# Patient Record
(demographics unavailable — no encounter records)

---

## 2024-11-08 NOTE — HISTORY OF PRESENT ILLNESS
[de-identified] : 51F with medical history of uterine fibroids presents as a post- hospital visit to hematology clinic for anemia workup. Pt was admitted to [facility] 09/14-16 for symptomatic anemia (pre-syncopal) and was found to have Hgb 3.9, MCV 54.9, and EKG c/w demand ischemia s/p 3u pRBCs. CTA with no active bleed; TVUS c/w multiple enlarged uterine fibroids. Iron studies %sat 8, ferritin 12. Discharged with Hgb 7.8 on progesterone. Pt reports continued fatigue however improvement since admission. Pt states she has hx of menorrhagia however never required transfusions. Bleeding had improved s/p fibroidectomy '09 - however became heavy again over the last "few years." Soaks through 3-4 heavy pads a day. Has no other known medical hx, but recalls being told she had iron deficiency anemia in the past. Intermittently takes PO iron however states causes severe constipation. Denies hematuria, melena, hematochezia. Pt admitted to [facility] on 9/14 and reports continued minimal vaginal bleeding/ spotting today (Day 10 of menses). No LOC, dizziness, chest pain, palpitations.  No hx colonoscopy Social hx: denies hx of tobacco use, social alcohol use Medication: minipill Fam hx: no malignancy or heme disorders. [de-identified] : Feels well overall. Completed iron infusions. Pt reports started progesterone ~ Mid- october. Pt reports heavy menstrual bleeding - 3-4 pads/ day, soaked. Menses began last Thursday. Due to heavy bleeding given TXA (started 11/07). Today Day 7 of menses and light today. Denies LOC, dizziness, CP, palpitations, SOB, abdominal pain. Pt planned for partial hysterectomy in January.

## 2024-11-08 NOTE — ASSESSMENT
[FreeTextEntry1] : 51F with medical history of uterine fibroids presents as a post- hospital visit to hematology clinic for anemia workup.  # Iron deficiency anemia likely d/t acute blood loss 2/2 uterine fibroids - post Hospital d/c (admitted with Hgb 3.9, MCV 54.9) s/p 3u pRBCs with impr Hgb 7.8. Found to have recurrence of uterine fibroids s/p norethindrone with continued heavy bleeding s/p TXA - s/p iron infusions x4 with improved symptoms  - f/u gynecology- 01/2024 planned for partial hysterectomy - recommend preventative care- due for colonoscopy - labs drawn today: CBC, iron panel, ferritin, hapto, hg electrophoresis

## 2025-01-16 NOTE — ASSESSMENT
[Patient Optimized for Surgery] : Patient optimized for surgery [No Further Testing Recommended] : no further testing recommended [Modify anti-platelet treatment prior to procedure] : Modify anti-platelet treatment prior to procedure [As per surgery] : as per surgery [FreeTextEntry4] : Intermediate risk procedure pt with good functional status (METS>4) EKG:  nsr, normal intervals, no st/t changes [FreeTextEntry6] : Hold ASA for 7 days

## 2025-01-16 NOTE — HISTORY OF PRESENT ILLNESS
[No Pertinent Cardiac History] : no history of aortic stenosis, atrial fibrillation, coronary artery disease, recent myocardial infarction, or implantable device/pacemaker [(Patient denies any chest pain, claudication, dyspnea on exertion, orthopnea, palpitations or syncope)] : Patient denies any chest pain, claudication, dyspnea on exertion, orthopnea, palpitations or syncope [Moderate (4-6 METs)] : Moderate (4-6 METs) [No Pertinent Pulmonary History] : no history of asthma, COPD, sleep apnea, or smoking [No Adverse Anesthesia Reaction] : no adverse anesthesia reaction in self or family member [Chronic Anticoagulation] : no chronic anticoagulation [Chronic Kidney Disease] : no chronic kidney disease [Diabetes] : no diabetes [FreeTextEntry1] : Hysterectomy  [FreeTextEntry2] : 1/27/25 [FreeTextEntry3] : Dr Winslow [FreeTextEntry4] : Ms. ROYAL is a51 year F with PMH of  [place]  who comes for pre op optimization. No complaints today. Pt is able to complete 2 flights of stairs without SOB/CP.

## 2025-02-05 NOTE — HISTORY OF PRESENT ILLNESS
[Pain is well-controlled] : pain is well-controlled [Fever] : no fever [Chills] : no chills [Nausea] : no nausea [Vomiting] : no vomiting [Clean/Dry/Intact] : clean, dry and intact [Erythema] : not erythematous [Swelling] : not swollen [Dehiscence] : not dehisced [Discharge] : absent of discharge [None] : no vaginal bleeding [Normal] : normal [Pathology reviewed] : pathology reviewed [de-identified] : FORTINO ROYAL is a 51 year y/o female who presents for 2 week post op check.

## 2025-02-05 NOTE — HISTORY OF PRESENT ILLNESS
[Pain is well-controlled] : pain is well-controlled [Fever] : no fever [Chills] : no chills [Nausea] : no nausea [Vomiting] : no vomiting [Clean/Dry/Intact] : clean, dry and intact [Erythema] : not erythematous [Swelling] : not swollen [Dehiscence] : not dehisced [Discharge] : absent of discharge [None] : no vaginal bleeding [Normal] : normal [Pathology reviewed] : pathology reviewed [de-identified] : FORTINO ROYAL is a 51 year y/o female who presents for 2 week post op check.

## 2025-02-05 NOTE — PLAN
Positive Covid exposure  Here for   Cough  Congestion  Fever  Body aches  Headache [de-identified] : Follow up in 4 weeks

## 2025-03-11 NOTE — HISTORY OF PRESENT ILLNESS
[Pain is well-controlled] : pain is well-controlled [Clean/Dry/Intact] : clean, dry and intact [None] : no vaginal bleeding [Normal] : normal [Pathology reviewed] : pathology reviewed [Fever] : no fever [Chills] : no chills [Nausea] : no nausea [Vomiting] : no vomiting [Vaginal Bleeding] : no vaginal bleeding [Vaginal Discharge] : no vaginal discharge [Erythema] : not erythematous [de-identified] : send estradiol

## 2025-03-11 NOTE — HISTORY OF PRESENT ILLNESS
[Pain is well-controlled] : pain is well-controlled [Clean/Dry/Intact] : clean, dry and intact [None] : no vaginal bleeding [Normal] : normal [Pathology reviewed] : pathology reviewed [Fever] : no fever [Chills] : no chills [Nausea] : no nausea [Vomiting] : no vomiting [Vaginal Bleeding] : no vaginal bleeding [Vaginal Discharge] : no vaginal discharge [Erythema] : not erythematous [de-identified] : send estradiol

## 2025-06-13 NOTE — HISTORY OF PRESENT ILLNESS
[FreeTextEntry1] : FORTINO ROYAL is a 52 year y/o female who presents for follow up after starting HRT. She reports improvement in mood symptoms and brain fog.